# Patient Record
Sex: FEMALE | Race: WHITE | Employment: FULL TIME | URBAN - METROPOLITAN AREA
[De-identification: names, ages, dates, MRNs, and addresses within clinical notes are randomized per-mention and may not be internally consistent; named-entity substitution may affect disease eponyms.]

---

## 2019-03-30 ENCOUNTER — HOSPITAL ENCOUNTER (OUTPATIENT)
Age: 54
Discharge: HOME | End: 2019-03-30
Payer: COMMERCIAL

## 2019-03-30 VITALS
TEMPERATURE: 98 F | DIASTOLIC BLOOD PRESSURE: 68 MMHG | RESPIRATION RATE: 18 BRPM | HEART RATE: 77 BPM | SYSTOLIC BLOOD PRESSURE: 139 MMHG | BODY MASS INDEX: 21.99 KG/M2 | OXYGEN SATURATION: 100 % | WEIGHT: 132 LBS | HEIGHT: 65 IN

## 2019-03-30 DIAGNOSIS — J06.9 VIRAL UPPER RESPIRATORY TRACT INFECTION: Primary | ICD-10-CM

## 2019-03-30 PROCEDURE — 99202 OFFICE O/P NEW SF 15 MIN: CPT

## 2019-03-30 PROCEDURE — 87430 STREP A AG IA: CPT

## 2019-03-30 PROCEDURE — 99203 OFFICE O/P NEW LOW 30 MIN: CPT

## 2019-03-30 RX ORDER — LEVOTHYROXINE SODIUM 0.03 MG/1
25 TABLET ORAL
Refills: 99 | COMMUNITY
Start: 2019-03-08

## 2019-03-30 RX ORDER — FLUTICASONE PROPIONATE AND SALMETEROL 50; 100 UG/1; UG/1
POWDER RESPIRATORY (INHALATION)
Refills: 99 | COMMUNITY
Start: 2019-01-01

## 2019-03-30 NOTE — ED INITIAL ASSESSMENT (HPI)
Pt states Tuesday, began having a sore throat with some sinus congestion. Pt states having a dry cough that sometimes hurts. Pt states having the chills some times but has not had a fever. Pt denies NVD.

## 2019-03-30 NOTE — ED PROVIDER NOTES
Patient Seen in: [facility]    History   Patient presents with:  Sore Throat  Cough/URI    Stated Complaint: SORE THROAT    HPI    The patient has been ill for 4-5 days with sore throat, nasal congestion, mild headache, respiratory distress. No wheezes, rhonchi, or rales  Abdominal: Soft, There is no tenderness. No guarding  Musculoskeletal: Normal range of motion. No edema or tenderness. Neurological: Oriented x 3, alert.  No cranial nerve deficit. motor and sensory g

## 2021-07-05 ENCOUNTER — HOSPITAL ENCOUNTER (OUTPATIENT)
Age: 56
Discharge: HOME | End: 2021-07-05
Payer: COMMERCIAL

## 2021-07-05 VITALS
RESPIRATION RATE: 17 BRPM | TEMPERATURE: 98 F | OXYGEN SATURATION: 100 % | HEART RATE: 92 BPM | DIASTOLIC BLOOD PRESSURE: 82 MMHG | SYSTOLIC BLOOD PRESSURE: 129 MMHG

## 2021-07-05 DIAGNOSIS — J02.9 VIRAL PHARYNGITIS: Primary | ICD-10-CM

## 2021-07-05 LAB — S PYO AG THROAT QL: NEGATIVE

## 2021-07-05 PROCEDURE — 87880 STREP A ASSAY W/OPTIC: CPT | Performed by: NURSE PRACTITIONER

## 2021-07-05 PROCEDURE — 99203 OFFICE O/P NEW LOW 30 MIN: CPT | Performed by: NURSE PRACTITIONER

## 2021-07-05 NOTE — ED INITIAL ASSESSMENT (HPI)
Pt here with complaints of sore throat and congestion that has been going on for 2 days , pt states his son was recently diagnosed with strep throat, pt denies any fevers

## 2021-07-05 NOTE — ED PROVIDER NOTES
Patient presents with:  Sore Throat      HPI:     Oma Damian is a 48year old female who presents for evaluation and management of a chief complaint of sore throat, postnasal drip, and a minimal dry cough for the past few days.   Her son is currently be Frequency of Social Gatherings with Friends and Family:       Attends Yarsanism Services:       Active Member of Clubs or Organizations:       Attends Club or Organization Meetings:       Marital Status:   Intimate Partner Violence:       Fear of Current o Jessica Perez [id number]  [phone]    Schedule an appointment as soon as possible for a visit in 2 days

## 2023-06-19 ENCOUNTER — HOSPITAL ENCOUNTER (OUTPATIENT)
Age: 58
Discharge: HOME | End: 2023-06-19
Payer: COMMERCIAL

## 2023-06-19 VITALS
OXYGEN SATURATION: 100 % | RESPIRATION RATE: 16 BRPM | DIASTOLIC BLOOD PRESSURE: 80 MMHG | TEMPERATURE: 98 F | SYSTOLIC BLOOD PRESSURE: 158 MMHG | HEART RATE: 72 BPM

## 2023-06-19 DIAGNOSIS — S06.0X0A CONCUSSION WITHOUT LOSS OF CONSCIOUSNESS, INITIAL ENCOUNTER: Primary | ICD-10-CM

## 2023-06-19 PROCEDURE — 99213 OFFICE O/P EST LOW 20 MIN: CPT

## 2023-06-19 NOTE — ED INITIAL ASSESSMENT (HPI)
Patient states a ladder got loose and hit patient in the back of head. Patient now reporting non-migrating headache since yesterday and slight nausea after eating. Patient denies taking blood thinners or any other accompanying symptoms. Patient attempted advil yesterday and did have relief.

## 2023-06-19 NOTE — DISCHARGE INSTRUCTIONS
Routing to provider to review and advise.   Estefany Rubi RN       You can use Tylenol or Motrin for pain. Decrease stimulus to your brain - less use of cell phone, TV, screens. Drink plenty of fluids. Avoid strenuous activities/sports until symptoms are resolved. If you develop any worsening headache, vomiting, blurred vision, dizziness or any other worsening or concerning complaints you should go to the ED. If you do not have improvement of symptoms in the next week, make an appointment with PCP.

## 2023-11-28 ENCOUNTER — HOSPITAL ENCOUNTER (OUTPATIENT)
Age: 58
Discharge: HOME | End: 2023-11-28
Payer: COMMERCIAL

## 2023-11-28 ENCOUNTER — APPOINTMENT (OUTPATIENT)
Dept: GENERAL RADIOLOGY | Age: 58
End: 2023-11-28
Attending: NURSE PRACTITIONER
Payer: COMMERCIAL

## 2023-11-28 VITALS
HEART RATE: 73 BPM | TEMPERATURE: 98 F | SYSTOLIC BLOOD PRESSURE: 135 MMHG | OXYGEN SATURATION: 100 % | DIASTOLIC BLOOD PRESSURE: 79 MMHG | RESPIRATION RATE: 20 BRPM

## 2023-11-28 DIAGNOSIS — J06.9 UPPER RESPIRATORY TRACT INFECTION, UNSPECIFIED TYPE: ICD-10-CM

## 2023-11-28 DIAGNOSIS — R05.1 ACUTE COUGH: Primary | ICD-10-CM

## 2023-11-28 LAB
#MXD IC: 0.5 X10ˆ3/UL (ref 0.1–1)
ATRIAL RATE: 67 BPM
BUN BLD-MCNC: 13 MG/DL (ref 7–18)
CHLORIDE BLD-SCNC: 105 MMOL/L (ref 98–112)
CO2 BLD-SCNC: 26 MMOL/L (ref 21–32)
CREAT BLD-MCNC: 0.7 MG/DL
DDIMER WHOLE BLOOD: <200 NG/ML DDU (ref ?–400)
EGFRCR SERPLBLD CKD-EPI 2021: 101 ML/MIN/1.73M2 (ref 60–?)
GLUCOSE BLD-MCNC: 80 MG/DL (ref 70–99)
HCT VFR BLD AUTO: 39.1 %
HCT VFR BLD CALC: 42 %
HGB BLD-MCNC: 13.2 G/DL
ISTAT IONIZED CALCIUM FOR CHEM 8: 1.21 MMOL/L (ref 1.12–1.32)
LYMPHOCYTES # BLD AUTO: 0.9 X10ˆ3/UL (ref 1–4)
LYMPHOCYTES NFR BLD AUTO: 20.5 %
MCH RBC QN AUTO: 31.6 PG (ref 26–34)
MCHC RBC AUTO-ENTMCNC: 33.8 G/DL (ref 31–37)
MCV RBC AUTO: 93.5 FL (ref 80–100)
MIXED CELL %: 12.1 %
NEUTROPHILS # BLD AUTO: 2.8 X10ˆ3/UL (ref 1.5–7.7)
NEUTROPHILS NFR BLD AUTO: 67.4 %
P AXIS: 33 DEGREES
P-R INTERVAL: 136 MS
PLATELET # BLD AUTO: 145 X10ˆ3/UL (ref 150–450)
POTASSIUM BLD-SCNC: 3.9 MMOL/L (ref 3.6–5.1)
Q-T INTERVAL: 388 MS
QRS DURATION: 90 MS
QTC CALCULATION (BEZET): 409 MS
R AXIS: 77 DEGREES
RBC # BLD AUTO: 4.18 X10ˆ6/UL
SODIUM BLD-SCNC: 143 MMOL/L (ref 136–145)
T AXIS: 33 DEGREES
TROPONIN I BLD-MCNC: <0.02 NG/ML
VENTRICULAR RATE: 67 BPM
WBC # BLD AUTO: 4.2 X10ˆ3/UL (ref 4–11)

## 2023-11-28 PROCEDURE — 84484 ASSAY OF TROPONIN QUANT: CPT | Performed by: NURSE PRACTITIONER

## 2023-11-28 PROCEDURE — 99213 OFFICE O/P EST LOW 20 MIN: CPT | Performed by: NURSE PRACTITIONER

## 2023-11-28 PROCEDURE — 93000 ELECTROCARDIOGRAM COMPLETE: CPT | Performed by: NURSE PRACTITIONER

## 2023-11-28 PROCEDURE — 85025 COMPLETE CBC W/AUTO DIFF WBC: CPT | Performed by: NURSE PRACTITIONER

## 2023-11-28 PROCEDURE — 71046 X-RAY EXAM CHEST 2 VIEWS: CPT | Performed by: NURSE PRACTITIONER

## 2023-11-28 PROCEDURE — 80047 BASIC METABLC PNL IONIZED CA: CPT | Performed by: NURSE PRACTITIONER

## 2023-11-28 RX ORDER — ESTRADIOL AND NORETHINDRONE ACETATE 1; .5 MG/1; MG/1
TABLET ORAL
COMMUNITY
Start: 2023-10-25

## 2023-11-28 RX ORDER — FLUTICASONE PROPIONATE 50 MCG
1 SPRAY, SUSPENSION (ML) NASAL DAILY
COMMUNITY

## 2023-11-28 NOTE — ED INITIAL ASSESSMENT (HPI)
Patient had covid at the beginning of the month and continues to have sinus pressure, cough and pain in her lungs. She states she has a wet cough but nothing comes up. She denies any fever.

## 2024-03-21 ENCOUNTER — HOSPITAL ENCOUNTER (OUTPATIENT)
Age: 59
Discharge: HOME | End: 2024-03-21
Payer: COMMERCIAL

## 2024-03-21 VITALS
HEART RATE: 77 BPM | OXYGEN SATURATION: 100 % | TEMPERATURE: 98 F | RESPIRATION RATE: 18 BRPM | DIASTOLIC BLOOD PRESSURE: 76 MMHG | SYSTOLIC BLOOD PRESSURE: 141 MMHG

## 2024-03-21 DIAGNOSIS — B97.89 VIRAL RESPIRATORY ILLNESS: Primary | ICD-10-CM

## 2024-03-21 DIAGNOSIS — J98.8 VIRAL RESPIRATORY ILLNESS: Primary | ICD-10-CM

## 2024-03-21 LAB
POCT INFLUENZA A: NEGATIVE
POCT INFLUENZA B: NEGATIVE

## 2024-03-21 RX ORDER — ALBUTEROL SULFATE 90 UG/1
2 AEROSOL, METERED RESPIRATORY (INHALATION) EVERY 4 HOURS PRN
Qty: 1 EACH | Refills: 0 | Status: SHIPPED | OUTPATIENT
Start: 2024-03-21 | End: 2024-04-20

## 2024-03-21 NOTE — ED PROVIDER NOTES
Patient Seen in: [facility]    History   CC: runny nose  HPI: Malaika Hines 58 year old female  who presents c/o runny nose, congestion, cough for the last 5 days.  Son sick with similar.  Home COVID test negative.  Had COVID 4 months ago per patient.  Denies GI signs/symptoms, fever, rash.    Past Medical History:   Diagnosis Date    Asthma (HCC)     Thyroid disease        History reviewed. No pertinent surgical history.    History reviewed. No pertinent family history.    Social History     Socioeconomic History    Marital status:    Tobacco Use    Smoking status: Never    Smokeless tobacco: Never   Substance and Sexual Activity    Alcohol use: Never    Drug use: Never       ROS:  Review of Systems    Positive for stated complaint: Congestion  Other systems are as noted in HPI.  Constitutional and vital signs reviewed.      All other systems reviewed and negative except as noted above.    PSFH elements reviewed from today and agreed except as otherwise stated in HPI.             Constitutional and vital signs reviewed.        Physical Exam     ED Triage Vitals [03/21/24 1816]   /76   Pulse 77   Resp 18   Temp 97.5 °F (36.4 °C)   Temp src Temporal   SpO2 100 %   O2 Device None (Room air)       Current:/76   Pulse 77   Temp 97.5 °F (36.4 °C) (Temporal)   Resp 18   SpO2 100%         PE:  General - Appears well, non-toxic and in NAD  Head - Appears symmetrical without deformity/swelling cranium, scalp, or facial bones  Eyes - sclera not injected, no discharge noted, no periorbital edema  ENT - EAC bilaterally without discharge, TM pearly grey with COL visualized appropriately bilaterally.   no nasal drainage noted in nares bilat, no cobblestoning to post. Pharynx.   Oropharynx clear, posterior pharynx is without erythema and without tonsilar enlargement or exudate, uvula midline, +gag, voice is clear. No trismus  Neck - no significant adenopathy, supple with trachea midline  Resp -  Lung sounds clear bilaterally and wob unlabored, good aeration with equal, even expansion bilaterally   CV - RRR  Skin - no rashes or petechiae noted, pink warm and dry throughout, mmm, cap refill <2seconds  Neuro - A&O x4, steady gait  MSK - makes purposeful movements of all extremities, radial pulses 2+ bilat.  Psych - Interactive and appropriate      ED Course     Labs Reviewed   POCT FLU TEST - Normal    Narrative:     This assay is a rapid molecular in vitro test utilizing nucleic acid amplification of influenza A and B viral RNA.       MDM       DDx: influenza, unspecified viral illness    Influenza negative.  Home COVID test negative.  Declines PCR.  General viral illness instructions reviewed, rest, hydration instructions, Tylenol or Motrin as needed for discomfort, sinus rinses/irrigations etc. as well as follow-up and return/ED precautions reviewed.  Patient is historian and demonstrates understanding of all instruction and agrees with plan of care.      Disposition and Plan     Clinical Impression:  1. Viral respiratory illness        Disposition:  Discharge    Follow-up:  Candis Luna MD  [address] [place] [id number] [phone]    Schedule an appointment as soon as possible for a visit in 2 days        Medications Prescribed:  Discharge Medication List as of 3/21/2024  7:00 PM        START taking these medications    Details   albuterol 108 (90 Base) MCG/ACT Inhalation Aero Soln Inhale 2 puffs into the lungs every 4 (four) hours as needed for Wheezing or Shortness of Breath (spastic cough)., Normal, Disp-1 each, R-0

## 2024-04-03 ENCOUNTER — HOSPITAL ENCOUNTER (OUTPATIENT)
Age: 59
Discharge: HOME | End: 2024-04-03
Payer: COMMERCIAL

## 2024-04-03 ENCOUNTER — APPOINTMENT (OUTPATIENT)
Dept: GENERAL RADIOLOGY | Age: 59
End: 2024-04-03
Attending: NURSE PRACTITIONER
Payer: COMMERCIAL

## 2024-04-03 VITALS
HEART RATE: 79 BPM | OXYGEN SATURATION: 100 % | SYSTOLIC BLOOD PRESSURE: 149 MMHG | TEMPERATURE: 98 F | RESPIRATION RATE: 14 BRPM | DIASTOLIC BLOOD PRESSURE: 70 MMHG

## 2024-04-03 DIAGNOSIS — R05.8 PRODUCTIVE COUGH: Primary | ICD-10-CM

## 2024-04-03 DIAGNOSIS — J01.00 ACUTE MAXILLARY SINUSITIS, RECURRENCE NOT SPECIFIED: ICD-10-CM

## 2024-04-03 PROCEDURE — 71046 X-RAY EXAM CHEST 2 VIEWS: CPT | Performed by: NURSE PRACTITIONER

## 2024-04-03 PROCEDURE — 99213 OFFICE O/P EST LOW 20 MIN: CPT | Performed by: NURSE PRACTITIONER

## 2024-04-03 RX ORDER — AMOXICILLIN AND CLAVULANATE POTASSIUM 875; 125 MG/1; MG/1
1 TABLET, FILM COATED ORAL 2 TIMES DAILY
Qty: 20 TABLET | Refills: 0 | Status: SHIPPED | OUTPATIENT
Start: 2024-04-03 | End: 2024-04-13

## 2024-04-03 RX ORDER — PREDNISONE 20 MG/1
40 TABLET ORAL DAILY
Qty: 10 TABLET | Refills: 0 | Status: SHIPPED | OUTPATIENT
Start: 2024-04-03 | End: 2024-04-08

## 2024-04-03 NOTE — ED INITIAL ASSESSMENT (HPI)
Pt c/o congestion x2 weeks, better then worse since x5 days with head congestion, chest congestion and cough.  No fever.

## 2024-04-04 NOTE — DISCHARGE INSTRUCTIONS
Tylenol/ibuprofen as needed for pain and fever  Supportive care: Run humidifier, increase fluids, elevate head of bed   frequent nasal clearing, saline spray/steam showers.   Follow-up with your doctor as needed return for any new or worsening symptoms at any time

## 2024-04-04 NOTE — ED PROVIDER NOTES
Patient Seen in: [facility]      History     Chief Complaint   Patient presents with    Cough     Stated Complaint: Congestion    Subjective:   HPI    58-year-old female presents with cold symptoms nasal congestion postnasal drip and cough for the last 2 weeks.  She does report sinus congestion and ear congestion.  She states she seemed to get better and then the symptoms returned more significantly and have been worse for the last 5 days.  She reports worsening nasal congestion cough congestion and productive cough.  Intermittent wheezing.  No fever.  She does have history of asthma.  She does take Advair and albuterol as needed.  No shortness of breath.  No fever.    Objective:   Past Medical History:   Diagnosis Date    Asthma (HCC)     Thyroid disease               History reviewed. No pertinent surgical history.             Social History     Socioeconomic History    Marital status:    Tobacco Use    Smoking status: Never    Smokeless tobacco: Never   Vaping Use    Vaping Use: Never used   Substance and Sexual Activity    Alcohol use: Yes     Comment: rarely    Drug use: Never              Review of Systems    Positive for stated complaint: Congestion  Other systems are as noted in HPI.  Constitutional and vital signs reviewed.      All other systems reviewed and negative except as noted above.    Physical Exam     ED Triage Vitals [04/03/24 1829]   /70   Pulse 79   Resp 14   Temp 97.9 °F (36.6 °C)   Temp src Temporal   SpO2 100 %   O2 Device None (Room air)       Current:/70   Pulse 79   Temp 97.9 °F (36.6 °C) (Temporal)   Resp 14   SpO2 100%         Physical Exam  Vitals and nursing note reviewed.   Constitutional:       Appearance: Normal appearance.   HENT:      Right Ear: A middle ear effusion is present.      Left Ear: Tympanic membrane normal.      Nose: Congestion and rhinorrhea present.      Mouth/Throat:      Pharynx: No posterior oropharyngeal erythema.   Eyes:       Extraocular Movements: Extraocular movements intact.      Pupils: Pupils are equal, round, and reactive to light.   Cardiovascular:      Rate and Rhythm: Normal rate and regular rhythm.      Pulses: Normal pulses.      Heart sounds: Normal heart sounds.   Chest:      Comments: Congested cough no wheezing  Neurological:      Mental Status: She is alert.               ED Course   Labs Reviewed - No data to display                   MDM      Productive cough for 3 weeks sinus congestion x 2 weeks worsening x 5 days  Sinusitis, viral versus bacterial, otitis, strep, COVID, flu rule out pneumonia secondary to persistent productive cough  Recent home COVID test was negative  I personally reviewed the chest x-ray                                 Medical Decision Making  Problems Addressed:  Acute maxillary sinusitis, recurrence not specified: acute illness or injury with systemic symptoms  Productive cough: acute illness or injury    Amount and/or Complexity of Data Reviewed  Radiology: ordered and independent interpretation performed. Decision-making details documented in ED Course.    Risk  OTC drugs.  Prescription drug management.        Disposition and Plan     Clinical Impression:  1. Productive cough    2. Acute maxillary sinusitis, recurrence not specified         Disposition:  Discharge  4/3/2024  7:25 pm    Follow-up:  Candis Luna MD  [facility] [address] [address] [phone]    Schedule an appointment as soon as possible for a visit             Medications Prescribed:  Discharge Medication List as of 4/3/2024  7:26 PM        START taking these medications    Details   amoxicillin clavulanate 875-125 MG Oral Tab Take 1 tablet by mouth 2 (two) times daily for 10 days., Normal, Disp-20 tablet, R-0      predniSONE 20 MG Oral Tab Take 2 tablets (40 mg total) by mouth daily for 5 days., Normal, Disp-10 tablet, R-0

## 2024-08-06 ENCOUNTER — HOSPITAL ENCOUNTER (OUTPATIENT)
Age: 59
Discharge: HOME | End: 2024-08-06
Payer: COMMERCIAL

## 2024-08-06 VITALS
TEMPERATURE: 97 F | RESPIRATION RATE: 16 BRPM | DIASTOLIC BLOOD PRESSURE: 66 MMHG | OXYGEN SATURATION: 100 % | SYSTOLIC BLOOD PRESSURE: 136 MMHG | HEART RATE: 87 BPM
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
## 2024-08-06 PROCEDURE — 99213 OFFICE O/P EST LOW 20 MIN: CPT | Performed by: NURSE PRACTITIONER

## 2024-08-06 RX ORDER — CEPHALEXIN 500 MG/1
500 CAPSULE ORAL 4 TIMES DAILY
Qty: 28 CAPSULE | Refills: 0 | Status: SHIPPED | OUTPATIENT
Start: 2024-08-06 | End: 2024-08-13

## 2024-08-06 NOTE — ED PROVIDER NOTES
Patient Seen in: [facility]      History   No chief complaint on file.    Stated Complaint: bug bite    Subjective:   HPI    58 yr old female here for evaluation of red, raised, warm area to right upper back that has progressed from a small bite she noticed last Thursday to this area while hiking on vacation. Today noticed it red, warm and larger.  pulled out 2 dark FB from area, possible residual insect? She denies fever or chills, HA or neck pain, numbness or tingling to arm.    Objective:   Past Medical History:    Asthma (HCC)    Thyroid disease              History reviewed. No pertinent surgical history.             Social History     Socioeconomic History    Marital status:    Tobacco Use    Smoking status: Never    Smokeless tobacco: Never   Vaping Use    Vaping status: Never Used   Substance and Sexual Activity    Alcohol use: Yes     Comment: rarely    Drug use: Never     Social Determinants of Health      Received from [facility], [facility]    Social Connections    Received from [facility], [facility]    Housing Stability              Review of Systems    Positive for stated Chief Complaint: No chief complaint on file.    Other systems are as noted in HPI.  Constitutional and vital signs reviewed.      All other systems reviewed and negative except as noted above.    Physical Exam     ED Triage Vitals [08/06/24 0935]   /66   Pulse 87   Resp 16   Temp 97.3 °F (36.3 °C)   Temp src Temporal   SpO2 100 %   O2 Device None (Room air)       Current Vitals:   Vital Signs  BP: 136/66  Pulse: 87  Resp: 16  Temp: 97.3 °F (36.3 °C)  Temp src: Temporal    Oxygen Therapy  SpO2: 100 %  O2 Device: None (Room air)            Physical Exam  Vitals and nursing note reviewed.   Constitutional:       General: She is not in acute distress.     Appearance: Normal appearance. She is not ill-appearing,  toxic-appearing or diaphoretic.   Cardiovascular:      Rate and Rhythm: Normal rate.      Pulses: Normal pulses.   Musculoskeletal:         General: Normal range of motion.   Skin:     General: Skin is warm.      Findings: Erythema present.             Comments: Approx 3x3cm area of erythema, warmth, redness, no central clearing, no scaling, vesicles, urticaria   Neurological:      Mental Status: She is alert and oriented to person, place, and time.   Psychiatric:         Mood and Affect: Mood normal.         Behavior: Behavior normal.               ED Course   Labs Reviewed - No data to display                   MDM     58 yr old female here for evaluation of red, raised warm area to right upper back after an insect bite last week.     ON exam, pt well appearing otherwise. Breathing easy. No fever or tachycardia.   Noted approx 3x3cm area of red raised warm circular rash to right upper back. No surrounding induration. No fluctuance to middle. No obvious bulls eye, central clearing, vesicles or drainage.    Differential diagnoses reflecting the complexity of care include but are not limited to cellulitis related to insect bite, large local reaction.    Comorbidities that add complexity to management include: asthma  History obtained by an independent source was from: patient  My independent interpretations of studies include: none performed  Shared decision making was done by: patient, myself  Discussions of management was done with: patient    Patient is well appearing, non-toxic and in no acute distress.  Vital signs are stable.   Discussed topical hydrocortisone, ice to area to help with swelling/warmth, keflex x 7 days.    PT agrees with plan of care.  All questions answered. Return and ER precautions given.    Counseled: Patient, regarding diagnosis, regarding treatment plan, regarding diagnostic results, regarding prescription, I have discussed with the patient the results of tests, differential diagnosis, and  warning signs and symptoms that should prompt immediate return. The patient understands these instructions and agrees to the follow-up plan provided. There is no barriers to learning. Appropriate f/u given. Patient agrees to return for any concerns/ problems/complications.                                       Medical Decision Making      Disposition and Plan     Clinical Impression:  1. Bug bite with infection, initial encounter         Disposition:  Discharge  8/6/2024  1:50 pm    Follow-up:  Candis Luna MD  [address] [address] [phone]      As needed          Medications Prescribed:  Discharge Medication List as of 8/6/2024 10:12 AM        START taking these medications    Details   cephalexin 500 MG Oral Cap Take 1 capsule (500 mg total) by mouth 4 (four) times daily for 7 days., Normal, Disp-28 capsule, R-0

## 2024-08-06 NOTE — ED INITIAL ASSESSMENT (HPI)
Patient is here with a red raised area on her right upper back  she states it itches.  She states she also has some right sided.

## 2024-08-06 NOTE — DISCHARGE INSTRUCTIONS
Use topical hydrocortisone 1% cream two times a day to area to help if itchy.    ICE 15 min on 2 hours off often helps with inflammation and brenda the itch.    Take antibiotic prescribed and finish full course.    RETURN FOR worsening rash, fever or chills, difficulty breathing or swallowing.

## 2024-08-31 ENCOUNTER — HOSPITAL ENCOUNTER (OUTPATIENT)
Age: 59
Discharge: HOME | End: 2024-08-31
Payer: COMMERCIAL

## 2024-08-31 VITALS
OXYGEN SATURATION: 100 % | SYSTOLIC BLOOD PRESSURE: 109 MMHG | RESPIRATION RATE: 20 BRPM | TEMPERATURE: 99 F | DIASTOLIC BLOOD PRESSURE: 73 MMHG | HEART RATE: 79 BPM

## 2024-08-31 DIAGNOSIS — J02.9 SORE THROAT: Primary | ICD-10-CM

## 2024-08-31 LAB — S PYO AG THROAT QL: NEGATIVE

## 2024-08-31 NOTE — ED PROVIDER NOTES
Patient Seen in: [facility]      History   No chief complaint on file.    Stated Complaint: Throat issue    Subjective:   HPI    58-year-old female past medical history of asthma, seasonal allergies presents for evaluation of persistent throat irritation for 1 week.  Patient denies any other associated symptoms.  Taking Flonase for her seasonal allergies at baseline.  Denies any fevers, cough, difficulty swallowing, oral or throat swelling, vomiting. No known sick contacts.     Objective:   Past Medical History:    Asthma (HCC)    Thyroid disease              History reviewed. No pertinent surgical history.             Social History     Socioeconomic History    Marital status:    Tobacco Use    Smoking status: Never     Passive exposure: Never    Smokeless tobacco: Never   Vaping Use    Vaping status: Never Used   Substance and Sexual Activity    Alcohol use: Yes     Comment: rarely    Drug use: Never     Social Determinants of Health      Received from [facility], [facility]    Social Connections    Received from [facility], [facility]    Housing Stability              Review of Systems    Positive for stated Chief Complaint: No chief complaint on file.    Other systems are as noted in HPI.  Constitutional and vital signs reviewed.      All other systems reviewed and negative except as noted above.    Physical Exam     ED Triage Vitals [08/31/24 0820]   /73   Pulse 79   Resp 20   Temp 98.9 °F (37.2 °C)   Temp src Temporal   SpO2 100 %   O2 Device None (Room air)       Current Vitals:   Vital Signs  BP: 109/73  Pulse: 79  Resp: 20  Temp: 98.9 °F (37.2 °C)  Temp src: Temporal    Oxygen Therapy  SpO2: 100 %  O2 Device: None (Room air)            Physical Exam  Vitals and nursing note reviewed.   Constitutional:       General: She is not in acute distress.     Appearance: Normal appearance. She is not  ill-appearing or toxic-appearing.   HENT:      Head: Normocephalic.      Ears:      Comments: Serous effusion bilaterally      Nose: Nose normal.      Mouth/Throat:      Mouth: Mucous membranes are moist.      Comments: Uvula midline. PND. No tonsillar exudates or enlargement.   Eyes:      Conjunctiva/sclera: Conjunctivae normal.   Cardiovascular:      Rate and Rhythm: Normal rate and regular rhythm.   Pulmonary:      Effort: Pulmonary effort is normal. No respiratory distress.      Breath sounds: Normal breath sounds.   Musculoskeletal:         General: Normal range of motion.      Cervical back: Normal range of motion.   Lymphadenopathy:      Cervical: No cervical adenopathy.   Skin:     General: Skin is warm.   Neurological:      General: No focal deficit present.      Mental Status: She is alert.   Psychiatric:         Mood and Affect: Mood normal.         Behavior: Behavior normal.             ED Course     Labs Reviewed   POCT RAPID STREP - Normal                      MDM                                      Medical Decision Making  Sore throat  Differential viral versus bacterial versus fungal versus allergies  Afebrile vital signs are stable.  No systemic symptoms.  Strep was negative.  Suspect postnasal drip irritating her throat secondary to her allergies.  Supportive measures advised.  Advised that if symptoms persist or worsen she should return for further evaluation and management.  If symptoms persist she may end up needing to follow-up with ENT for further evaluation and management of her symptoms.    Disposition and Plan     Clinical Impression:  1. Sore throat         Disposition:  Discharge  8/31/2024  8:49 am    Follow-up:  Candis Luna MD  [address] [id number] [phone]          [address] [address]  [phone]              Medications Prescribed:  Current Discharge Medication List

## 2025-08-16 ENCOUNTER — HOSPITAL ENCOUNTER (OUTPATIENT)
Age: 60
Discharge: HOME | End: 2025-08-16

## 2025-08-16 ENCOUNTER — APPOINTMENT (OUTPATIENT)
Dept: CT IMAGING | Facility: HOSPITAL | Age: 60
End: 2025-08-16
Attending: NURSE PRACTITIONER

## 2025-08-16 ENCOUNTER — APPOINTMENT (OUTPATIENT)
Dept: GENERAL RADIOLOGY | Age: 60
End: 2025-08-16
Attending: NURSE PRACTITIONER

## 2025-08-16 VITALS
TEMPERATURE: 98 F | SYSTOLIC BLOOD PRESSURE: 149 MMHG | DIASTOLIC BLOOD PRESSURE: 80 MMHG | HEART RATE: 84 BPM | OXYGEN SATURATION: 100 % | RESPIRATION RATE: 18 BRPM

## 2025-08-16 DIAGNOSIS — M51.46 SCHMORL'S NODES OF LUMBAR REGION: ICD-10-CM

## 2025-08-16 DIAGNOSIS — M54.40 BACK PAIN OF LUMBAR REGION WITH SCIATICA: Primary | ICD-10-CM

## 2025-08-16 PROCEDURE — 96372 THER/PROPH/DIAG INJ SC/IM: CPT | Performed by: NURSE PRACTITIONER

## 2025-08-16 PROCEDURE — 99214 OFFICE O/P EST MOD 30 MIN: CPT | Performed by: NURSE PRACTITIONER

## 2025-08-16 PROCEDURE — 72131 CT LUMBAR SPINE W/O DYE: CPT | Performed by: NURSE PRACTITIONER

## 2025-08-16 PROCEDURE — 72100 X-RAY EXAM L-S SPINE 2/3 VWS: CPT | Performed by: NURSE PRACTITIONER

## 2025-08-16 RX ORDER — HYDROCODONE BITARTRATE AND ACETAMINOPHEN 5; 325 MG/1; MG/1
1 TABLET ORAL ONCE
Refills: 0 | Status: COMPLETED | OUTPATIENT
Start: 2025-08-16 | End: 2025-08-16

## 2025-08-16 RX ORDER — PREDNISONE 20 MG/1
40 TABLET ORAL DAILY
Qty: 10 TABLET | Refills: 0 | Status: SHIPPED | OUTPATIENT
Start: 2025-08-16 | End: 2025-08-21

## 2025-08-16 RX ORDER — PREDNISONE 20 MG/1
60 TABLET ORAL ONCE
Status: COMPLETED | OUTPATIENT
Start: 2025-08-16 | End: 2025-08-16

## 2025-08-16 RX ORDER — KETOROLAC TROMETHAMINE 30 MG/ML
30 INJECTION, SOLUTION INTRAMUSCULAR; INTRAVENOUS ONCE
Status: COMPLETED | OUTPATIENT
Start: 2025-08-16 | End: 2025-08-16

## 2025-08-16 RX ORDER — NAPROXEN 500 MG/1
500 TABLET ORAL 2 TIMES DAILY PRN
Qty: 20 TABLET | Refills: 0 | Status: SHIPPED | OUTPATIENT
Start: 2025-08-16 | End: 2025-08-26

## 2025-08-16 RX ORDER — DIAZEPAM 5 MG/1
2.5 TABLET ORAL ONCE
Status: COMPLETED | OUTPATIENT
Start: 2025-08-16 | End: 2025-08-16

## 2025-08-16 RX ORDER — CYCLOBENZAPRINE HCL 10 MG
10 TABLET ORAL 3 TIMES DAILY PRN
Qty: 21 TABLET | Refills: 0 | Status: SHIPPED | OUTPATIENT
Start: 2025-08-16 | End: 2025-08-23

## 2025-08-18 ENCOUNTER — SPINE CENTER NAVIGATION (OUTPATIENT)
Age: 60
End: 2025-08-18